# Patient Record
Sex: FEMALE | Race: ASIAN | NOT HISPANIC OR LATINO | URBAN - METROPOLITAN AREA
[De-identification: names, ages, dates, MRNs, and addresses within clinical notes are randomized per-mention and may not be internally consistent; named-entity substitution may affect disease eponyms.]

---

## 2017-01-03 ENCOUNTER — AMBULATORY - HEALTHEAST (OUTPATIENT)
Dept: FAMILY MEDICINE | Facility: CLINIC | Age: 27
End: 2017-01-03

## 2017-01-03 DIAGNOSIS — Z30.42 ENCOUNTER FOR SURVEILLANCE OF INJECTABLE CONTRACEPTIVE: ICD-10-CM

## 2017-01-05 ENCOUNTER — AMBULATORY - HEALTHEAST (OUTPATIENT)
Dept: LAB | Facility: CLINIC | Age: 27
End: 2017-01-05

## 2017-01-05 ENCOUNTER — AMBULATORY - HEALTHEAST (OUTPATIENT)
Dept: NURSING | Facility: CLINIC | Age: 27
End: 2017-01-05

## 2017-01-05 DIAGNOSIS — Z30.42 ENCOUNTER FOR SURVEILLANCE OF INJECTABLE CONTRACEPTIVE: ICD-10-CM

## 2017-03-23 ENCOUNTER — AMBULATORY - HEALTHEAST (OUTPATIENT)
Dept: FAMILY MEDICINE | Facility: CLINIC | Age: 27
End: 2017-03-23

## 2017-03-23 ENCOUNTER — AMBULATORY - HEALTHEAST (OUTPATIENT)
Dept: NURSING | Facility: CLINIC | Age: 27
End: 2017-03-23

## 2017-08-01 ENCOUNTER — OFFICE VISIT - HEALTHEAST (OUTPATIENT)
Dept: FAMILY MEDICINE | Facility: CLINIC | Age: 27
End: 2017-08-01

## 2017-08-01 DIAGNOSIS — R42 DIZZINESS: ICD-10-CM

## 2017-08-01 DIAGNOSIS — Z30.9 CONTRACEPTION MANAGEMENT: ICD-10-CM

## 2017-08-01 ASSESSMENT — MIFFLIN-ST. JEOR: SCORE: 1285.81

## 2017-08-08 ENCOUNTER — OFFICE VISIT - HEALTHEAST (OUTPATIENT)
Dept: FAMILY MEDICINE | Facility: CLINIC | Age: 27
End: 2017-08-08

## 2017-08-08 DIAGNOSIS — K13.70 ORAL MUCOSAL LESION: ICD-10-CM

## 2017-08-08 DIAGNOSIS — K13.79 MASS OF FLOOR OF MOUTH: ICD-10-CM

## 2017-08-08 DIAGNOSIS — J02.9 SORE THROAT: ICD-10-CM

## 2017-08-08 ASSESSMENT — MIFFLIN-ST. JEOR: SCORE: 1286.95

## 2017-08-17 ENCOUNTER — COMMUNICATION - HEALTHEAST (OUTPATIENT)
Dept: FAMILY MEDICINE | Facility: CLINIC | Age: 27
End: 2017-08-17

## 2017-10-25 ENCOUNTER — AMBULATORY - HEALTHEAST (OUTPATIENT)
Dept: NURSING | Facility: CLINIC | Age: 27
End: 2017-10-25

## 2018-01-17 ENCOUNTER — AMBULATORY - HEALTHEAST (OUTPATIENT)
Dept: NURSING | Facility: CLINIC | Age: 28
End: 2018-01-17

## 2018-05-09 ENCOUNTER — COMMUNICATION - HEALTHEAST (OUTPATIENT)
Dept: NURSING | Facility: CLINIC | Age: 28
End: 2018-05-09

## 2018-05-25 ENCOUNTER — OFFICE VISIT - HEALTHEAST (OUTPATIENT)
Dept: FAMILY MEDICINE | Facility: CLINIC | Age: 28
End: 2018-05-25

## 2018-05-25 DIAGNOSIS — R42 DIZZINESS: ICD-10-CM

## 2018-05-25 DIAGNOSIS — D69.6 THROMBOCYTOPENIA (H): ICD-10-CM

## 2018-05-25 DIAGNOSIS — B19.10 HEPATITIS B VIRUS INFECTION: ICD-10-CM

## 2018-05-25 DIAGNOSIS — M54.9 UPPER BACK PAIN ON LEFT SIDE: ICD-10-CM

## 2018-05-25 DIAGNOSIS — Z30.9 CONTRACEPTION MANAGEMENT: ICD-10-CM

## 2018-05-25 LAB
ALBUMIN SERPL-MCNC: 4.4 G/DL (ref 3.5–5)
ALP SERPL-CCNC: 62 U/L (ref 45–120)
ALT SERPL W P-5'-P-CCNC: 25 U/L (ref 0–45)
AST SERPL W P-5'-P-CCNC: 19 U/L (ref 0–40)
BILIRUB DIRECT SERPL-MCNC: 0.2 MG/DL
BILIRUB SERPL-MCNC: 0.5 MG/DL (ref 0–1)
ERYTHROCYTE [DISTWIDTH] IN BLOOD BY AUTOMATED COUNT: 11.9 % (ref 11–14.5)
HCG UR QL: NEGATIVE
HCT VFR BLD AUTO: 43.3 % (ref 35–47)
HGB BLD-MCNC: 14.4 G/DL (ref 12–16)
MCH RBC QN AUTO: 28.4 PG (ref 27–34)
MCHC RBC AUTO-ENTMCNC: 33.2 G/DL (ref 32–36)
MCV RBC AUTO: 86 FL (ref 80–100)
PLATELET # BLD AUTO: 154 THOU/UL (ref 140–440)
PMV BLD AUTO: 7.6 FL (ref 7–10)
PROT SERPL-MCNC: 7.3 G/DL (ref 6–8)
RBC # BLD AUTO: 5.05 MILL/UL (ref 3.8–5.4)
SP GR UR STRIP: 1.02 (ref 1–1.03)
WBC: 6.1 THOU/UL (ref 4–11)

## 2018-05-25 ASSESSMENT — MIFFLIN-ST. JEOR: SCORE: 1302.82

## 2018-05-29 LAB
HBV DNA SERPL NAA+PROBE-ACNC: <20 [IU]/ML
HBV DNA SERPL NAA+PROBE-LOG IU: <1.3 {LOG_IU}/ML

## 2018-06-04 ENCOUNTER — OFFICE VISIT - HEALTHEAST (OUTPATIENT)
Dept: PHYSICAL THERAPY | Facility: REHABILITATION | Age: 28
End: 2018-06-04

## 2018-06-04 ENCOUNTER — COMMUNICATION - HEALTHEAST (OUTPATIENT)
Dept: FAMILY MEDICINE | Facility: CLINIC | Age: 28
End: 2018-06-04

## 2018-06-04 DIAGNOSIS — M89.8X1 CHRONIC SCAPULAR PAIN: ICD-10-CM

## 2018-06-04 DIAGNOSIS — M62.81 GENERALIZED MUSCLE WEAKNESS: ICD-10-CM

## 2018-06-04 DIAGNOSIS — G89.29 CHRONIC SCAPULAR PAIN: ICD-10-CM

## 2018-06-04 DIAGNOSIS — R29.3 POOR POSTURE: ICD-10-CM

## 2018-06-18 ENCOUNTER — OFFICE VISIT - HEALTHEAST (OUTPATIENT)
Dept: PHYSICAL THERAPY | Facility: REHABILITATION | Age: 28
End: 2018-06-18

## 2018-06-18 DIAGNOSIS — R29.3 POOR POSTURE: ICD-10-CM

## 2018-06-18 DIAGNOSIS — M89.8X1 CHRONIC SCAPULAR PAIN: ICD-10-CM

## 2018-06-18 DIAGNOSIS — G89.29 CHRONIC SCAPULAR PAIN: ICD-10-CM

## 2018-06-18 DIAGNOSIS — M62.81 GENERALIZED MUSCLE WEAKNESS: ICD-10-CM

## 2018-08-22 ENCOUNTER — AMBULATORY - HEALTHEAST (OUTPATIENT)
Dept: NURSING | Facility: CLINIC | Age: 28
End: 2018-08-22

## 2018-09-20 ENCOUNTER — COMMUNICATION - HEALTHEAST (OUTPATIENT)
Dept: FAMILY MEDICINE | Facility: CLINIC | Age: 28
End: 2018-09-20

## 2018-09-21 ENCOUNTER — OFFICE VISIT - HEALTHEAST (OUTPATIENT)
Dept: FAMILY MEDICINE | Facility: CLINIC | Age: 28
End: 2018-09-21

## 2018-09-21 DIAGNOSIS — Z30.9 CONTRACEPTION MANAGEMENT: ICD-10-CM

## 2018-09-21 DIAGNOSIS — M62.838 MUSCLE SPASMS OF NECK: ICD-10-CM

## 2018-09-21 LAB
HCG UR QL: NEGATIVE
SP GR UR STRIP: 1.02 (ref 1–1.03)

## 2018-09-21 ASSESSMENT — MIFFLIN-ST. JEOR: SCORE: 1325.5

## 2018-12-31 ENCOUNTER — OFFICE VISIT - HEALTHEAST (OUTPATIENT)
Dept: FAMILY MEDICINE | Facility: CLINIC | Age: 28
End: 2018-12-31

## 2018-12-31 DIAGNOSIS — M62.838 MUSCLE SPASMS OF NECK: ICD-10-CM

## 2018-12-31 DIAGNOSIS — Z30.42 ENCOUNTER FOR SURVEILLANCE OF INJECTABLE CONTRACEPTIVE: ICD-10-CM

## 2018-12-31 DIAGNOSIS — R10.12 ABDOMINAL PAIN, LEFT UPPER QUADRANT: ICD-10-CM

## 2018-12-31 LAB
ALBUMIN SERPL-MCNC: 4.2 G/DL (ref 3.5–5)
ALP SERPL-CCNC: 49 U/L (ref 45–120)
ALT SERPL W P-5'-P-CCNC: 26 U/L (ref 0–45)
ANION GAP SERPL CALCULATED.3IONS-SCNC: 11 MMOL/L (ref 5–18)
AST SERPL W P-5'-P-CCNC: 18 U/L (ref 0–40)
BILIRUB SERPL-MCNC: 0.6 MG/DL (ref 0–1)
BUN SERPL-MCNC: 11 MG/DL (ref 8–22)
CALCIUM SERPL-MCNC: 9.3 MG/DL (ref 8.5–10.5)
CHLORIDE BLD-SCNC: 107 MMOL/L (ref 98–107)
CO2 SERPL-SCNC: 23 MMOL/L (ref 22–31)
CREAT SERPL-MCNC: 0.73 MG/DL (ref 0.6–1.1)
GFR SERPL CREATININE-BSD FRML MDRD: >60 ML/MIN/1.73M2
GLUCOSE BLD-MCNC: 97 MG/DL (ref 70–125)
HCG UR QL: NEGATIVE
LIPASE SERPL-CCNC: 13 U/L (ref 0–52)
POTASSIUM BLD-SCNC: 3.5 MMOL/L (ref 3.5–5)
PROT SERPL-MCNC: 7.4 G/DL (ref 6–8)
SODIUM SERPL-SCNC: 141 MMOL/L (ref 136–145)
SP GR UR STRIP: 1.02 (ref 1–1.03)

## 2018-12-31 RX ORDER — CYCLOBENZAPRINE HCL 5 MG
10 TABLET ORAL EVERY 8 HOURS PRN
Qty: 30 TABLET | Refills: 1 | Status: SHIPPED | OUTPATIENT
Start: 2018-12-31

## 2018-12-31 ASSESSMENT — MIFFLIN-ST. JEOR: SCORE: 1322.1

## 2019-01-02 ENCOUNTER — COMMUNICATION - HEALTHEAST (OUTPATIENT)
Dept: FAMILY MEDICINE | Facility: CLINIC | Age: 29
End: 2019-01-02

## 2019-01-07 ENCOUNTER — AMBULATORY - HEALTHEAST (OUTPATIENT)
Dept: FAMILY MEDICINE | Facility: CLINIC | Age: 29
End: 2019-01-07

## 2019-01-07 LAB
H PYLORI AG STL QL IA: ABNORMAL
REPORT STATUS: ABNORMAL
SPECIMEN DESCRIPTION: ABNORMAL

## 2019-01-08 ENCOUNTER — COMMUNICATION - HEALTHEAST (OUTPATIENT)
Dept: FAMILY MEDICINE | Facility: CLINIC | Age: 29
End: 2019-01-08

## 2019-01-10 ENCOUNTER — OFFICE VISIT - HEALTHEAST (OUTPATIENT)
Dept: FAMILY MEDICINE | Facility: CLINIC | Age: 29
End: 2019-01-10

## 2019-01-11 ENCOUNTER — OFFICE VISIT - HEALTHEAST (OUTPATIENT)
Dept: FAMILY MEDICINE | Facility: CLINIC | Age: 29
End: 2019-01-11

## 2019-01-11 DIAGNOSIS — A04.8 H. PYLORI INFECTION: ICD-10-CM

## 2019-01-11 ASSESSMENT — MIFFLIN-ST. JEOR: SCORE: 1340.24

## 2019-01-30 ENCOUNTER — AMBULATORY - HEALTHEAST (OUTPATIENT)
Dept: FAMILY MEDICINE | Facility: CLINIC | Age: 29
End: 2019-01-30

## 2019-01-30 ENCOUNTER — COMMUNICATION - HEALTHEAST (OUTPATIENT)
Dept: FAMILY MEDICINE | Facility: CLINIC | Age: 29
End: 2019-01-30

## 2019-01-30 DIAGNOSIS — Z30.42 ENCOUNTER FOR SURVEILLANCE OF INJECTABLE CONTRACEPTIVE: ICD-10-CM

## 2019-03-20 ENCOUNTER — AMBULATORY - HEALTHEAST (OUTPATIENT)
Dept: NURSING | Facility: CLINIC | Age: 29
End: 2019-03-20

## 2019-04-30 ENCOUNTER — OFFICE VISIT - HEALTHEAST (OUTPATIENT)
Dept: FAMILY MEDICINE | Facility: CLINIC | Age: 29
End: 2019-04-30

## 2019-04-30 DIAGNOSIS — R10.12 LEFT UPPER QUADRANT PAIN: ICD-10-CM

## 2019-04-30 RX ORDER — POLYETHYLENE GLYCOL 3350 17 G/17G
17 POWDER, FOR SOLUTION ORAL DAILY
Qty: 255 G | Refills: 2 | Status: SHIPPED | OUTPATIENT
Start: 2019-04-30

## 2019-04-30 RX ORDER — AMOXICILLIN 250 MG
2 CAPSULE ORAL DAILY PRN
Qty: 30 TABLET | Refills: 1 | Status: SHIPPED | OUTPATIENT
Start: 2019-04-30

## 2019-04-30 ASSESSMENT — MIFFLIN-ST. JEOR: SCORE: 1331.17

## 2019-07-12 ENCOUNTER — COMMUNICATION - HEALTHEAST (OUTPATIENT)
Dept: FAMILY MEDICINE | Facility: CLINIC | Age: 29
End: 2019-07-12

## 2019-07-12 DIAGNOSIS — B18.1 CHRONIC VIRAL HEPATITIS B WITHOUT DELTA AGENT AND WITHOUT COMA (H): ICD-10-CM

## 2021-05-28 NOTE — PROGRESS NOTES
ASSESMENT AND PLAN:  1. Left upper quadrant Abdominal pain, Chronic  -  Been having pain x 2 1/2 years; seems to be getting worse. Pain 7/10 and constant x 1 week.  Last BM this morning and feels better now.  Pt reports chronic constipation and has only BM once a week.    - Pt was recently treated for H.pylori infection and my still be having residual tenderness. - -  Exam with mild Epigastric and Left upper abdominal tenderness. Pain most likely from recent H.pylori infection and Chronic constipation.    -  Advised increasing fiber, fluids, and exercise in diet.    Orders:   - sucralfate (CARAFATE) 1 gram tablet; Take 1 tablet (1 g total) by mouth 4 (four) times a day for 14 days.  Dispense: 56 tablet; Refill: 0   - senna-docusate (PERICOLACE) 8.6-50 mg tablet; Take 2 tablets by mouth daily as needed for constipation.  Dispense: 30 tablet; Refill: 1   - polyethylene glycol (GLYCOLAX) 17 gram/dose powder; Take 17 g by mouth daily.  Dispense: 255 g; Refill: 2     Reviewed Medical/Social history and Medications.  See new changes above.   Discussed indications for emergent medical attention and routine F/u.  Patient/Parent/Guardian engaged in decision making process and verbalized understanding of the options discussed and agreed with the final treatment plan.     SUBJECTIVE:  Aquilino Cummings is a 28 y.o. female who presents  for evaluation of her Abdominal Pain.     Pt was recently treated for H. Pylori 2 months ago. She still complains of epigastric and Left upper abdominal pain.  She endorses constipation and having BM once a week.  Reports abdominal pain was 7/10 and feeling better after BM this morning.  Advised increasing fluids and fiber in diet and adding exercise.      Exam negative De Los Santos sign, McBurney's point.  Denies fever/chills, wheezing, SOB, CP, n/v, abdominal pain, diarrhea, hematochezia.      ROS:  Comprehensive Review of Systems Negative except stated in HPI.     Past Medical History:   Diagnosis Date      "Ear infection      Headache      Hepatitis B      Patient Active Problem List   Diagnosis     Chronic Hepatitis B     Lumbago     Chronic mycotic otitis externa     Thrombocytopenia (H)     H. pylori infection     Current Outpatient Medications   Medication Sig Dispense Refill     cyclobenzaprine (FLEXERIL) 5 MG tablet Take 2 tablets (10 mg total) by mouth every 8 (eight) hours as needed for muscle spasms. 30 tablet 1     meclizine (ANTIVERT) 25 mg tablet Take 1 tablet (25 mg total) by mouth 3 (three) times a day as needed for dizziness. 60 tablet 3     polyethylene glycol (GLYCOLAX) 17 gram/dose powder Take 17 g by mouth daily. 255 g 2     senna-docusate (PERICOLACE) 8.6-50 mg tablet Take 2 tablets by mouth daily as needed for constipation. 30 tablet 1     sucralfate (CARAFATE) 1 gram tablet Take 1 tablet (1 g total) by mouth 4 (four) times a day for 14 days. 56 tablet 0     Current Facility-Administered Medications   Medication Dose Route Frequency Provider Last Rate Last Dose     medroxyPROGESTERone injection 150 mg (DEPO-PROVERA)  150 mg Intramuscular Q3 Months Gala Cruz MD   150 mg at 04/30/19 0754     Social History     Tobacco Use   Smoking Status Passive Smoke Exposure - Never Smoker   Smokeless Tobacco Never Used   Tobacco Comment    parents/siblings/ smoke outside     OBJECTIVE: BP 92/56   Pulse (!) 102   Temp 98.6  F (37  C) (Oral)   Resp 20   Ht 4' 11.5\" (1.511 m)   Wt 156 lb (70.8 kg)   SpO2 97%   BMI 30.98 kg/m     No results found for this or any previous visit (from the past 24 hour(s)).    PHYSICAL:  General Alert, awake, not in acute distress.   HEENT:             -Head Atraumatic, normocephalic.            -Eyes PERRL, no erythema, discharge, conjunctiva clear.             -Ears TMs intact, no drainage, no erythema or edema.            -Nose    Nostrils patent,  no edema.            -Throat Oropharynx without edema, erythema.  Uvula midline, no deviation.             -Neck " Neck FROM, no adenopathy.  Thyroid not visibly enlarged.   CV Normal S1 & S2. No murmurs.   RESP Non-labored, RRR, CTAB. No wheezes or crackles.    ABDOMEN Epigastric and Left Upper quadrant tenderness. Soft. No rigidity, guarding.  Normal bowel sounds. Negative De Los Santos sign and McBurneys point.        Anton Painting PA-C

## 2021-05-30 ENCOUNTER — RECORDS - HEALTHEAST (OUTPATIENT)
Dept: ADMINISTRATIVE | Facility: CLINIC | Age: 31
End: 2021-05-30

## 2021-05-30 NOTE — TELEPHONE ENCOUNTER
Patient needs her yearly Hepatitis B labs done and I see she has a nurse appt next week.  Can you also make a lab-only appointment and annotate her nurse appt so everyone knows to also send her to lab, please?

## 2021-05-31 ENCOUNTER — RECORDS - HEALTHEAST (OUTPATIENT)
Dept: ADMINISTRATIVE | Facility: CLINIC | Age: 31
End: 2021-05-31

## 2021-05-31 VITALS — WEIGHT: 146 LBS | BODY MASS INDEX: 28.66 KG/M2 | HEIGHT: 60 IN

## 2021-05-31 VITALS — WEIGHT: 146.25 LBS | BODY MASS INDEX: 28.71 KG/M2 | HEIGHT: 60 IN

## 2021-06-01 VITALS — HEIGHT: 60 IN | BODY MASS INDEX: 29.4 KG/M2 | WEIGHT: 149.75 LBS

## 2021-06-01 VITALS — BODY MASS INDEX: 30.38 KG/M2 | WEIGHT: 154.75 LBS | HEIGHT: 60 IN

## 2021-06-02 VITALS — HEIGHT: 60 IN | BODY MASS INDEX: 30.63 KG/M2 | WEIGHT: 156 LBS

## 2021-06-02 VITALS — BODY MASS INDEX: 31.02 KG/M2 | HEIGHT: 60 IN | WEIGHT: 158 LBS

## 2021-06-02 VITALS — HEIGHT: 60 IN | WEIGHT: 154 LBS | BODY MASS INDEX: 30.23 KG/M2

## 2021-06-05 ENCOUNTER — RECORDS - HEALTHEAST (OUTPATIENT)
Dept: ADMINISTRATIVE | Facility: CLINIC | Age: 31
End: 2021-06-05

## 2021-06-12 NOTE — PROGRESS NOTES
"HPI - 27 yo female here to restart depo.     She wants to restart depo.   Last depo shot was 3/24/17  LMP - none since delivery in 2/2016 b/c on depo  UPT neg    Dizziness -   Started last week, occurs daily and intermittent with movement and walking  Rooms spins and she has to sit down and lasts 1-2 minutes  She gets dizzy in the car.   No ringing in ears, no hearing loss  No GI sx  Took dizzy pill in the past several years ago    No current outpatient prescriptions on file.     Vitals:    08/01/17 1436   BP: 100/70   Pulse: 71   Resp: 16   Temp: 98.8  F (37.1  C)   TempSrc: Oral   SpO2: 96%   Weight: 146 lb (66.2 kg)   Height: 4' 11.5\" (1.511 m)     OBJECTIVE:  Vitals listed above within normal limits  General appearance: well groomed, pleasant, well hydrated, nontoxic appearing  ENT: PERRL, throat clear  Neck: neck supple, no lymphadenopathy, no thyromegaly  Lungs: lungs clear to auscultation bilaterally, no wheezes or rhonchi  Heart: regular rate and rhythm, no murmurs, rubs or gallops  Abdomen: soft, nontender  Neuro: no focal deficits, CN II-XII grossly intact, alert and oriented  Psych:  mood stable, appears to have good insight and judgment    A/P  1. Birth control   UPT neg  Restart depo today  RTC in 3 months    2. Dizziness   rx for meclizine      "

## 2021-06-12 NOTE — PROGRESS NOTES
Subjective:   Eh See Emiliano presents today with an .  She comes in with a sore throat.  She has had this for 2 days.  She has noticed a little bit of a swelling under the tongue on the right side of the mouth.  She would like that evaluated as well.  She denies any high fevers.  The right side of her neck is somewhat achy.  She has had mild congestion in the nose.  She denies headaches.  She has had no stomach upset.  She denies any rash of any kind.  No nausea or vomiting or diarrhea has been present.      Objective:  HEENT: Both TMs appear gray.  Conjunctivae are clear.  Sinuses nontender.  Nasal mucosa is slightly inflamed but clear exudate noted.  Good airflow present.  The pharynx reveals mild erythema posteriorly.  No exudate noted.  There is a 1 cm pigmented lesion in the posterior left buccal mucosa across from the posterior molars.  No exudate noted in that area.  The pigment is a blue black color.  There also is an elongated mass in the sublingual area on the floor the mouth on the right side.  This is anterior to the tongue.  It is slightly tender.  Neck: No lymphadenopathy present.  No thyromegaly present.  Lungs: Lungs today are clear bilaterally.  Cardiac: There is no murmur heard.  No ectopy present.  Skin: No rashes are present.  Abdomen: Abdomen is soft and nontender.  Bowel sounds are active throughout.      Assessment:  1.  Pigmented mucosal lesion left buccal mucosa.  Rule out mucosal melanoma.  2.  Sublingual mass right anterior floor of mouth  3.  Pharyngitis most likely virally induced.  Strep test was negative      Plan:  The patient will be referred to otolaryngology for evaluation of the pigmented lesion in the lesion in the floor of the mouth.  Referral was placed in the chart.  We will assist her with making this appointment.  Tylenol as needed for body aches and sore throat.  She will try to take extra liquids.

## 2021-06-16 PROBLEM — A04.8 H. PYLORI INFECTION: Status: ACTIVE | Noted: 2019-01-13

## 2021-06-18 NOTE — PROGRESS NOTES
Patient no-show #2 today. Previous no-show on 6/11/18. Called patient using , but pt was not home.   Pt will be charged per no-show policy.    Monse Gresham, PT, DPT

## 2021-06-18 NOTE — PROGRESS NOTES
Assessment/Plan:     Problem List Items Addressed This Visit     Chronic Hepatitis B     Patient had not had monitoring labs for a while so we will get those done today.         Relevant Orders    HM2(CBC w/o Differential) (Completed)    Hepatitis B DNA Quantitative Real-Time PCR(HBQNT)($$$)    Hepatic Profile    Thrombocytopenia      Other Visit Diagnoses     Contraception management    -  Primary    She has been off and on Depakote, it was overdue for injection today.  UPT negative; will resume Depo-Provera injections.    Relevant Medications    medroxyPROGESTERone injection 150 mg (DEPO-PROVERA) (Start on 5/26/2018  9:00 AM)    Other Relevant Orders    Pregnancy, Urine (Completed)    Upper back pain on left side        Chronic problem, just brought up for the first time today. Seems to be due to muscle tension.  Will try cyclobenzaprine and physical therapy.    Relevant Medications    cyclobenzaprine (FLEXERIL) 10 MG tablet    Other Relevant Orders    Ambulatory referral to PT/OT    Dizziness        Refilled meclizine, which has been helpful to her in the past.    Relevant Medications    meclizine (ANTIVERT) 25 mg tablet               Subjective:      Eh See Emiliano is a 27 y.o. female who presents for perception restart and left shoulder/back pain.  Also addressed hepatitis B today.      Left neck/upper back/shoulder pain x9 years  Sometimes burning  Started after she had a baby and she was asking her family to massage her back.  She felt/heard something that seemed loud like something was breaking and it has hurt ever since. Did hurt quite a bit at first.     Always there.  Worse with work when she moves around sheets and blankets.  No meds currently; tried ibuprofen/Tylenol in the past but it helps the temporarily.    Contraception: Wants to restart Depo-Provera.  No menstrual period in a long time.     Last Depo was 1/17/18    Menstrual/OB History:  #: 1, Date: 01/08/05, Sex: Female, Weight: 6 lb 9.8 oz (3 kg),  GA: 40w0d, Delivery: Vaginal, Spontaneous Delivery, Apgar1: None, Apgar5: None, Living: Living, Birth Comments: None    #: 2, Date: 06, Sex: Male, Weight: 7 lb 0.9 oz (3.2 kg), GA: 39w0d, Delivery: Vaginal, Spontaneous Delivery, Apgar1: None, Apgar5: None, Living: Living, Birth Comments: None    #: 3, Date: 10/22/08, Sex: Female, Weight: 6 lb 2 oz (2.778 kg), GA: 39w5d, Delivery: Vaginal, Spontaneous Delivery, Apgar1: 8, Apgar5: 9, Living: Living, Birth Comments: Precipitous delivery; had been 4cm at last cervical check, then started pushing and delivered head prior to anyone even getting to room.  Had  contractions in the setting of BV. Postpartum hemorrhage; hgb dropped from 11 to 8.  Baby had jaundice    #: 4, Date: 02/15/16, Sex: Male, Weight: 8 lb 5 oz (3.771 kg), GA: 40w6d, Delivery: Vaginal, Spontaneous Delivery, Apgar1: 8, Apgar5: 9, Living: Living, Birth Comments: Had partial uterine inversion following delivery, with resulting postpartum hemorrhage and need for operating room.  Received 6U PRBC's.     No LMP recorded. Patient has had an injection.         Objective:       Gen:  Awake and alert, no acute distress.  Neck is supple without lymphadenopathy or thyromegaly  Back upper back is tender with excessive tone on the left between the scapula and thoracic spine  Neuro: Deep tendon reflexes 1+ bilateral biceps, triceps, brachioradialis  Musculoskeletal: Range of motion of the bilateral upper extremities is full.  Skin: No jaundice      Results for orders placed or performed in visit on 18   Pregnancy, Urine   Result Value Ref Range    Pregnancy Test, Urine Negative Negative    Specific Gravity, UA 1.020 1.001 - 1.030   HM2(CBC w/o Differential)   Result Value Ref Range    WBC 6.1 4.0 - 11.0 thou/uL    RBC 5.05 3.80 - 5.40 mill/uL    Hemoglobin 14.4 12.0 - 16.0 g/dL    Hematocrit 43.3 35.0 - 47.0 %    MCV 86 80 - 100 fL    MCH 28.4 27.0 - 34.0 pg    MCHC 33.2 32.0 - 36.0 g/dL    RDW 11.9  11.0 - 14.5 %    Platelets 154 140 - 440 thou/uL    MPV 7.6 7.0 - 10.0 fL

## 2021-06-18 NOTE — PROGRESS NOTES
[facility] Discharge Summary  Patient Name: Aquilino Cummings  Date: 12/17/2018  Referral Diagnosis: Shoulder pain  Referring provider: Gala Cruz MD  Visit Diagnosis:   1. Chronic scapular pain     2. Generalized muscle weakness     3. Poor posture         Goals: NOT MET  Pt. 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  The patient discontinued therapy, did not return.    Therapy will be discontinued at this time.  The patient will need a new referral to resume.    Thank you for your referral.  Monse Gresham  12/17/2018  3:15 PM  [facility]   Shoulder Initial Evaluation    Patient Name: Aquilino Cummings  Date of evaluation: 6/4/2018  Referral Diagnosis: Shoulder pain   Referring provider: Gala Cruz MD  Visit Diagnosis:     ICD-10-CM    1. Chronic scapular pain M89.8X1     G89.29    2. Generalized muscle weakness M62.81    3. Poor posture R29.3        Assessment:     Aquilino Cummings is a 27 y.o. female who presents to therapy today with chief complaints of chronic L sided scapular pain for >10 years without known injury. Pain comes and goes, but has become constant over the last 2 years. 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 Sx are located in L scapular area with occasional radiation to L lateral cervical spine. Evaluation today reveals: restrictions in L cervical rotation with pain, postural deficits, weakness, + L spurlings, -ve shoulder screen. S/s do appear to be consistent with cervical facet syndrome, likely also aggravated by overuse and poor posture.  Patient will benefit from 1:1 skilled physical therapy services to address the above limitations.       Goals:  Pt. 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    Barriers to Learning or Achieving Goals:  No Barriers.       Plan / Patient Instructions:        Plan of Care:   Authorization / Certification Start Date: 06/04/18  Authorization / Certification Number of Visits: up to 6-8 visits   Communication with: Referral Source  Patient Related Instruction: Nature of Condition;Treatment plan and rationale;Self Care instruction;Basis of treatment;Body mechanics;Posture;Precautions;Next steps  Times per Week: 1-2x/week  Number of Weeks: up to 12 weeks  Number of Visits: up to 6-8 visits   Therapeutic Exercise: ROM;Stretching;Strengthening  Neuromuscular Reeducation: kinesio tape;posture;balance/proprioception;TNE;postural restoration;core  Manual Therapy: soft tissue mobilization;myofascial release;joint mobilization;muscle energy  Other Plan #1: therapeutic activity    POC and pathology of condition were reviewed with patient.  Pt. is in agreement with the Plan of Care  A Home Exercise Program (HEP) was initiated today.  Pt. was instructed in exercises by PT and patient was given a handout with detailed instructions.  Plan for next visit: review HEP, progress scapular strengthening   .   Subjective:       Aquilino Cummings is a 26 y/o female who presents today with chief c/o chronic L sided scapular pain for >10 years.   Patient reports her pain started 10 years ago after receiving a depo shot. No reported injury. Pain comes and goes, but has become constant over the last 2 years. 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 She is required to stand most of her shift with a short break every two hours. Pain is also aggravated by dishes, housework. Pain occasionally radiates to left lateral neck.       Social information:   Living Situation:single family home, lives with her mom and dad    Occupation: assembly full time, works night shift     Pain Ratin  Pain rating at best: 3  Pain rating at worst: 9  Pain description: aching and sharp    Patient reports benefit from:  rest         Objective:      Note: Items left blank indicates the item was not performed or not indicated at the time of the evaluation.    Patient Outcome Measures :      Shoulder Examination  1. Chronic scapular pain     2. Generalized muscle weakness     3. Poor posture         Precautions/Restrictions: None  Involved side: Left    Posture Observation:   Standing:  Sitting:     Cervical Clearing:  Flexion: 2 cm loss no increased pain  Extension: full no increased pain   R rotation: slight limitation, L sided pain   L rotation: full no pain        Special tests:   -ve distraction  + L spurlings localized pain  -ve R spurlings    Cervical isometrics:  Flx/SB/Ext strong and pain free throughout     Shoulder/Elbow ROM    Date:      Shoulder and Elbow ROM ( )   AROM in degrees AROM in degrees AROM in degrees    Right Left Right Left Right Left   Shoulder Flexion (0-180 ) Full no pain Full no pain        Shoulder Abduction (0-180 ) Full no increased pain Full no increased pain        Shoulder Extension (0-60 )         Shoulder ER (0-90 ) functional WNL no increased pain  functional WNL no increased pain        Shoulder IR (0-70 ) Functional WNL no increased pain functional WNL no increased pain        Shoulder IR/Ext         Elbow Flexion (150 )         Elbow Extension (0 )          PROM in degrees PROM in degrees PROM in degrees     "Right Left Right Left Right Left   Shoulder Flexion (0-180 )         Shoulder Abduction (0-180 )         Shoulder Extension (0-60 )         Shoulder ER (0-90 )         Shoulder IR (0-70 )         Elbow Flexion (150 )         Elbow Extension (0 )             Scapular mechanics:           Shoulder/Elbow Strength   Date:      Shoulder/Elbow Strength (/5)  Manual Muscle Test (MMT) MMT MMT MMT    Right Left Right Left Right Left   Shoulder Abduction         Supraspinatus         Infraspinatus         Shoulder Flexion         Shoulder Extension         Shoulder External Rotation         Shoulder Internal Rotation         Elbow Flexion         Elbow Extension         Other:         Other:           Joint Assessment:  Glenohumeral Joint Assessment:  Inferior glide:  Posterior glide:  Anterior glide:       Flexibility:     Shoulder Special Tests     Impingement Cluster Right (+/-) Left (+/-) Rotator Cuff Tests Right (+/-) Left (+/-)   Brooks-Wisam   Drop Arm Sign     Painful Arc   Hornblowers     Neer's Impingement    ERLS     Cheko's   IRLS     Infraspinatus Test/ER isometric         AC Tests Right (+/-) Left (+/-) Labral Tests Right (+/-) Left (+/-)   Active Compression   Biceps Load Test II     Crossbody Adduction   Jerk Test     AC Resisted Extension   Jazmin Test     GH Instability Tests Right (+/-) Left (+/-) Biceps Tests Right (+/-) Left (+/-)   Sulcus Sign   Speed     Apprehension   Yergason s     Relocation   Other:     Other:   Other:     Other:          Palpation:   + hypertonicity B SCM and scalene, R>L sided 1st rib tenderness       Treatment Today    TREATMENT MINUTES COMMENTS   Evaluation 25 Shoulder/neck eval   Self-care/ Home management     Manual therapy     Neuromuscular Re-education     Therapeutic Activity     Therapeutic Exercises 25 Initiated HEP:  Exercises:  Exercise #1: Chin tuck  Comment #1: HEP 2\" folded towel under posterior occiput x 15- 20 seconds   Exercise #2: Chin tuck with ext " isometric  Comment #2: HEP x 5 seconds x 15- 20 reps   Exercise #3: Seated levator stretch  Comment #3: HEP x 30 seconds x 2-3 reps  Exercise #4: Prone i's   Comment #4: HEP x 3 seconds x 10-15 reps   Educated patient on POC and expectations for PT.   Dicussed how posture and overuse can cause pain.    Gait training     Modality__________________                Total 50    Blank areas are intentional and mean the treatment did not include these items.       PT Evaluation Code: (Please list factors)  Patient History/Comorbidities: chronic hepatitis B  Examination: see above  Clinical Presentation: stable  Clinical Decision Making: low    Patient History/  Comorbidities Examination  (body structures and functions, activity limitations, and/or participation restrictions) Clinical Presentation Clinical Decision Making (Complexity)   No documented Comorbidities or personal factors 1-2 Elements Stable and/or uncomplicated Low   1-2 documented comorbidities or personal factor 3 Elements Evolving clinical presentation with changing characteristics Moderate   3-4 documented comorbidities or personal factors 4 or more Unstable and unpredictable High              Monse Gresham  6/4/2018  3:59 PM

## 2021-06-20 NOTE — PROGRESS NOTES
ASSESMENT AND PLAN:  Diagnoses and all orders for this visit:    1.  Contraception management  -  Pt has missed Depo shot and would like to continue. Denies SE.  -  UPT negative today.    Ordered:  -     Pregnancy, Urine  -     medroxyPROGESTERone injection 150 mg (DEPO-PROVERA); Inject 1 mL (150 mg total) into the shoulder, thigh, or buttocks every 3 (three) months.    2. Muscle spasms of neck  -  Tight neck muscles.   Ordered:  -     cyclobenzaprine (FLEXERIL) 5 MG tablet; Take 2 tablets (10 mg total) by mouth every 8 (eight) hours as needed for muscle spasms.  Dispense: 30 tablet; Refill: 1    3. Health maintenance  - Pap smear due.  Pt decline.   Last pap in 2015; normal.     Patient is present with a Professional , Aquilino Gordon.   Reviewed Medical/Social history and Medications.  See new changes above.   Discussed indications for emergent medical attention and routine F/u.  Patient/Parent/Guardian engaged in decision making process and verbalized understanding of the options discussed and agreed with the final treatment plan.     SUBJECTIVE:  Aquilino Mojica Emiliano is a 27 y.o. Female who presents for Depo shot.    Pt missed her dose last week and wants to continue getting the Depot shot.  UPT negative today.  She denies SE from Depot shots, fever/chills, wheezing, SOB, CP, n/v, abdominal pain, diarrhea/constipation.  Pt also has been having neck tightness.     ROS:  Comprehensive Review of Systems Negative except stated in HPI.     Past Medical History:   Diagnosis Date     Ear infection      Headache      Hepatitis B      Patient Active Problem List   Diagnosis     Chronic Hepatitis B     Lumbago     Chronic mycotic otitis externa     Thrombocytopenia (H)     Current Outpatient Prescriptions   Medication Sig Dispense Refill     meclizine (ANTIVERT) 25 mg tablet Take 1 tablet (25 mg total) by mouth 3 (three) times a day as needed for dizziness. 60 tablet 3     cyclobenzaprine (FLEXERIL) 5 MG tablet Take 2 tablets  "(10 mg total) by mouth every 8 (eight) hours as needed for muscle spasms. 30 tablet 1     Current Facility-Administered Medications   Medication Dose Route Frequency Provider Last Rate Last Dose     medroxyPROGESTERone injection 150 mg (DEPO-PROVERA)  150 mg Intramuscular Q3 Months Gala Cruz MD   150 mg at 09/21/18 1136     History   Smoking Status     Passive Smoke Exposure - Never Smoker   Smokeless Tobacco     Never Used     Comment: parents/siblings/ smoke outside     OBJECTIVE: BP 92/60  Pulse 60  Temp 98  F (36.7  C) (Oral)   Resp 18  Ht 4' 11.5\" (1.511 m)  Wt 154 lb 12 oz (70.2 kg)  SpO2 98%  Breastfeeding? No  BMI 30.73 kg/m2   No results found for this or any previous visit (from the past 24 hour(s)).    PHYSICAL:  General Alert, awake, not in acute distress.   CV Normal S1 & S2. No murmurs.   RESP Non-labored, RRR, CTAB. No wheezes or crackles.    EXTREMITY No pedal edema.    MUSCUKL Tight neck muscles, bilateral.    NEURO Grossly intact, no focal deficit.       Anton Painting PA-C           "

## 2021-06-22 NOTE — TELEPHONE ENCOUNTER
----- Message from Anton Painting PA-C sent at 1/7/2019 12:44 PM CST -----  Please call Pt about Positive Stool test for H. Pylori.  Will send in 4 Rxs. Please have Pt make appt and bring in the 4 meds so we can go over how to take them.

## 2021-06-22 NOTE — PROGRESS NOTES
ASSESMENT AND PLAN:  Diagnoses and all orders for this visit:    1.  Chronic Abdominal pain, left upper quadrant and epigastric  -   One year of constant 8/10 pain in LUQ but seems to be getting worse x 1 week.  Laying down helps relieves pain; worsens with long periods of sitting or standing.    -    Pt NAD, VS normal and exam unremarkable.  No peritoneal sign.    Ordered:  -     Comprehensive Metabolic Panel  -     Lipase  -     H. pylori Antigen, Stool(HPSAG)    2. Muscle spasms of neck  -  Controlled on current tx. Refill request.   Refill:  -     cyclobenzaprine (FLEXERIL) 5 MG tablet  Dispense: 30 tablet; Refill: 1    3.  Encounter for surveillance of injectable contraceptive  -  UPT: NEGATIVE.  Pt would like to continue with Depo   Ordered:  -     Pregnancy (Beta-hCG, Qual), Urine  -     medroxyPROGESTERone injection 150 mg (DEPO-PROVERA)    4. Other  -  Pt reports had Influenza vaccine done at work last week.     Pt is present with professional , Aquilino Wang.   Reviewed Medical/Social history and Medications.  See new changes above.   Discussed indications for emergent medical attention and routine F/u.  Patient/Parent/Guardian engaged in decision making process and verbalized understanding of the options discussed and agreed with the final treatment plan.     SUBJECTIVE:  Aquilino Mojica Emiliano is a 28 y.o. female who presents for Left upper abdominal pain  x 1 year.    Pain is  8/10 and constant x 1-2 weeks with radiation to back.   Exam showing mild epigastric and LUQ tenderness.   She reports current 8/10 pain, though Pt NAD and VS normal.  Denies fever/chills, wheezing, SOB, CP, n/v, dysuria, diarrhea/constipation, hematochezia, jaundice.  Pt does not appear to be having severe pain.     UPT negative, Pt would like to continue Depo.     ROS:  Comprehensive Review of Systems Negative except stated in HPI.     Past Medical History:   Diagnosis Date     Ear infection      Headache      Hepatitis B   "    Patient Active Problem List   Diagnosis     Chronic Hepatitis B     Lumbago     Chronic mycotic otitis externa     Thrombocytopenia (H)     Current Outpatient Medications   Medication Sig Dispense Refill     cyclobenzaprine (FLEXERIL) 5 MG tablet Take 2 tablets (10 mg total) by mouth every 8 (eight) hours as needed for muscle spasms. 30 tablet 1     meclizine (ANTIVERT) 25 mg tablet Take 1 tablet (25 mg total) by mouth 3 (three) times a day as needed for dizziness. 60 tablet 3     No current facility-administered medications for this visit.      Social History     Tobacco Use   Smoking Status Passive Smoke Exposure - Never Smoker   Smokeless Tobacco Never Used   Tobacco Comment    parents/siblings/ smoke outside     OBJECTIVE: /60   Pulse 66   Temp 98  F (36.7  C) (Oral)   Resp 8   Ht 4' 11.5\" (1.511 m)   Wt 154 lb (69.9 kg)   LMP  (Approximate) Comment: lmp 2 yrs ago and pt was on Depo before  SpO2 97%   Breastfeeding? No   BMI 30.58 kg/m       No results found for this or any previous visit (from the past 24 hour(s)).    PHYSICAL:  General Alert, awake, not in acute distress.   Eyes No icterus, conjunctival injection.    CV Normal S1 & S2. No murmurs.   RESP Non-labored, RRR, CTAB. No wheezes or crackles.    ABDOMEN Mild tenderness at epigastric and LUQ.  Soft. No rigidity, guarding.  Negative peritoneal sign. Normal bowel sounds.    EXTREMITY No edema, erythema, deformity.  Normal capillary refill.    SKIN No lesion, rashes, jaundice.        Anton Painting PA-C         "

## 2021-06-22 NOTE — TELEPHONE ENCOUNTER
Left message x 2. Please review the messages below and advise the patient or caller when they return the clinic's call. Please schedule as indicated below if necessary.

## 2021-06-22 NOTE — TELEPHONE ENCOUNTER
Juanita CMT left message x 1. When patient returns clinic call, please review notes below and advise/schedule. Thank you.

## 2021-06-22 NOTE — TELEPHONE ENCOUNTER
"Patient Returning Call  Reason for call:  Results below  Information relayed to patient:  Anton Painting- \"Please call Pt about normal labs.\"  Patient has additional questions:  No  If YES, what are your questions/concerns:  no  Okay to leave a detailed message?: No call back needed  "

## 2021-06-22 NOTE — TELEPHONE ENCOUNTER
----- Message from Anton Painting PA-C sent at 1/2/2019 10:04 AM CST -----  Please call Pt about normal labs.

## 2021-06-23 NOTE — TELEPHONE ENCOUNTER
Patient scheduled for Depo at 3/20 [facility] appointment.  Previous order has been completed.    Will Provider please evaluate and place order if indicated?  Thank you.

## 2021-06-23 NOTE — PROGRESS NOTES
ASSESMENT AND PLAN:  Diagnoses and all orders for this visit:    1. H. pylori infection, med eduation  -  Pt recently dx with H.pylori tx and told to bring in meds for education.  Went over med and how to take them.  She is currently on Depo shot. No allergies to meds. Discussed possible SE and indications for emergent f/u.  -  F/u in 1 month    Reviewed Medical/Social history and Medications.    Discussed indications for emergent medical attention and routine F/u.  Patient/Parent/Guardian engaged in decision making process and verbalized understanding of the options discussed and agreed with the final treatment plan.     SUBJECTIVE:  Eh See Emiliano is a 28 y.o. female who presents  for evaluation of her H. Pylori Med Teaching.    Pt dx with H.pylori infection on 1/7/19 and prescribed meds.  She was told to bring in meds for educations.  Went over how and when to take meds.  Discussed indications for emergent f/u such as allergic rx, though Pt has no known allergies.   Pt is currently on Depo.   No new or worsening sxs since last visit.     ROS:  Comprehensive Review of Systems Negative except stated in HPI.     Past Medical History:   Diagnosis Date     Ear infection      Headache      Hepatitis B      Patient Active Problem List   Diagnosis     Chronic Hepatitis B     Lumbago     Chronic mycotic otitis externa     Thrombocytopenia (H)     Current Outpatient Medications   Medication Sig Dispense Refill     cyclobenzaprine (FLEXERIL) 5 MG tablet Take 2 tablets (10 mg total) by mouth every 8 (eight) hours as needed for muscle spasms. 30 tablet 1     meclizine (ANTIVERT) 25 mg tablet Take 1 tablet (25 mg total) by mouth 3 (three) times a day as needed for dizziness. 60 tablet 3     amoxicillin (AMOXIL) 500 MG tablet Take 2 tablets (1,000 mg total) by mouth 2 (two) times a day for 14 days. 56 tablet 0     clarithromycin (BIAXIN) 500 MG tablet Take 1 tablet (500 mg total) by mouth 2 (two) times a day for 14 days. 28 tablet 0  "    metroNIDAZOLE (FLAGYL) 500 MG tablet Take 1 tablet (500 mg total) by mouth 2 (two) times a day for 14 days. 28 tablet 0     omeprazole (PRILOSEC) 20 MG capsule Take 1 capsule (20 mg total) by mouth 2 (two) times a day before meals for 14 days. 28 capsule 0     No current facility-administered medications for this visit.      Social History     Tobacco Use   Smoking Status Passive Smoke Exposure - Never Smoker   Smokeless Tobacco Never Used   Tobacco Comment    parents/siblings/ smoke outside     OBJECTIVE: BP 96/62   Pulse 70   Temp 98.2  F (36.8  C) (Oral)   Resp 18   Ht 4' 11.5\" (1.511 m)   Wt 158 lb (71.7 kg)   SpO2 99%   BMI 31.38 kg/m     No results found for this or any previous visit (from the past 24 hour(s)).    PHYSICAL:  General Alert, awake, not in acute distress.   CV Normal S1 & S2. No murmurs.   RESP Non-labored, RRR, CTAB. No wheezes or crackles.        Anton Painting PA-C         "

## 2107-09-19 ENCOUNTER — RECORDS - HEALTHEAST (OUTPATIENT)
Dept: ADMINISTRATIVE | Facility: OTHER | Age: OVER 89
End: 2107-09-19